# Patient Record
Sex: MALE | Race: WHITE | Employment: STUDENT | URBAN - METROPOLITAN AREA
[De-identification: names, ages, dates, MRNs, and addresses within clinical notes are randomized per-mention and may not be internally consistent; named-entity substitution may affect disease eponyms.]

---
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
## 2024-06-04 ENCOUNTER — OFFICE VISIT (OUTPATIENT)
Dept: FAMILY MEDICINE CLINIC | Facility: CLINIC | Age: 19
End: 2024-06-04
Payer: COMMERCIAL

## 2024-06-04 VITALS
RESPIRATION RATE: 16 BRPM | SYSTOLIC BLOOD PRESSURE: 116 MMHG | HEIGHT: 74 IN | DIASTOLIC BLOOD PRESSURE: 70 MMHG | OXYGEN SATURATION: 98 % | TEMPERATURE: 99 F | HEART RATE: 115 BPM | WEIGHT: 212 LBS | BODY MASS INDEX: 27.21 KG/M2

## 2024-06-04 DIAGNOSIS — H61.22 HEARING LOSS OF LEFT EAR DUE TO CERUMEN IMPACTION: Primary | ICD-10-CM

## 2024-06-04 PROCEDURE — 3074F SYST BP LT 130 MM HG: CPT | Performed by: FAMILY MEDICINE

## 2024-06-04 PROCEDURE — 3078F DIAST BP <80 MM HG: CPT | Performed by: FAMILY MEDICINE

## 2024-06-04 PROCEDURE — 99213 OFFICE O/P EST LOW 20 MIN: CPT | Performed by: FAMILY MEDICINE

## 2024-06-04 PROCEDURE — 3008F BODY MASS INDEX DOCD: CPT | Performed by: FAMILY MEDICINE

## 2024-06-04 RX ORDER — LISDEXAMFETAMINE DIMESYLATE CAPSULES 20 MG/1
20 CAPSULE ORAL EVERY MORNING
COMMUNITY
Start: 2024-04-03

## 2024-06-04 RX ORDER — LISDEXAMFETAMINE DIMESYLATE 30 MG/1
30 CAPSULE ORAL EVERY MORNING
COMMUNITY
Start: 2024-05-15

## 2024-06-04 RX ORDER — BREXPIPRAZOLE 0.5 MG/1
1 TABLET ORAL NIGHTLY
COMMUNITY
Start: 2024-02-08

## 2024-06-04 RX ORDER — BREXPIPRAZOLE 0.25 MG/1
TABLET ORAL
COMMUNITY
Start: 2023-12-27

## 2024-06-04 RX ORDER — LISDEXAMFETAMINE DIMESYLATE CAPSULES 10 MG/1
CAPSULE ORAL
COMMUNITY
Start: 2024-05-29

## 2024-06-04 RX ORDER — BUSPIRONE HYDROCHLORIDE 10 MG/1
TABLET ORAL
COMMUNITY
Start: 2020-08-01

## 2024-06-04 RX ORDER — PROPRANOLOL HYDROCHLORIDE 20 MG/1
TABLET ORAL
COMMUNITY
Start: 2022-01-01

## 2024-06-07 NOTE — PROGRESS NOTES
HPI:    Sin Mccauley is a 18 year old male who presents for Follow - Up (Continues to have issues with left ear. Not able to hear out of it per pt. )     Presenting with ear pressure and hearing distortion x few weeks/months, he reports no URI s/s.   Worse along left ear, history of excessive cerumen.     Past History:   He  has a past medical history of Allergic rhinitis, Anxiety, Asthma (HCC), Depression, and Extrinsic asthma, unspecified.   He  has no past surgical history on file.   His family history includes Cancer in his maternal grandfather; Depression in his maternal grandmother; Heart Disorder in his maternal grandmother; Stroke in his maternal grandfather.   He  reports that he has never smoked. He has never used smokeless tobacco. He reports that he does not drink alcohol and does not use drugs.     He is not on any long-term medications.   He is allergic to nuts.     Current Outpatient Medications on File Prior to Visit   Medication Sig    ARIPiprazole 2.5 mg Oral Tab     REXULTI 0.25 MG Oral Tab     REXULTI 0.5 MG Oral Tab Take 1 tablet by mouth nightly.    busPIRone 10 MG Oral Tab     Lisdexamfetamine Dimesylate 10 MG Oral Cap TAKE 1 CAPSULE BY MOUTH EVERY MORNING after meals with 30mg for total dose of 40mg    lisdexamfetamine 20 MG Oral Cap Take 1 capsule (20 mg total) by mouth every morning.    lisdexamfetamine 30 MG Oral Cap Take 1 capsule (30 mg total) by mouth every morning.    propranolol 20 MG Oral Tab     montelukast 10 MG Oral Tab Take 1 tablet (10 mg total) by mouth nightly.    PRISTIQ 25 MG Oral Tablet 24 Hr Take 1 tablet (25 mg total) by mouth daily.    albuterol (PROAIR HFA) 108 (90 Base) MCG/ACT Inhalation Aero Soln Inhale 2 puffs into the lungs every 6 (six) hours as needed for Wheezing or Shortness of Breath.     No current facility-administered medications on file prior to visit.         REVIEW OF SYSTEMS:   Patient denies shortness of breath, denies chest pain and denies any  recent fevers or chills.    Patient reports no urinary complaints and denies headaches or visual disturbances.   Patient denies any abdominal pain at this time. Patient has no new skin lesions.  Patient reports no acute back pain and reports no dizziness or headaches.   Patient reports no visual disturbances and reports hearing has been about the same.   Patient reports no recent injury or trauma.               EXAM:    /70   Pulse 115   Temp 98.9 °F (37.2 °C)   Resp 16   Ht 6' 2\" (1.88 m)   Wt 212 lb (96.2 kg)   SpO2 98%   BMI 27.22 kg/m²  Estimated body mass index is 27.22 kg/m² as calculated from the following:    Height as of this encounter: 6' 2\" (1.88 m).    Weight as of this encounter: 212 lb (96.2 kg).    General Appearance:  Alert, cooperative, no distress, appears stated age   Head:  Normocephalic, without obvious abnormality, atraumatic   Eyes:  conjunctiva/cornea is not erythematous.        Nose: No nasal drainage.    Throat: No erythema    Neck: Supple, symmetrical, trachea midline, and normal ROM  thyroid: no obvious nodules   Back:   Symmetric, no curvature, ROM normal, no CVA tenderness   Lungs:   Clear to auscultation bilaterally, respirations unlabored   Chest Wall:  No tenderness or deformity   Heart:  Regular rate and rhythm, S1, S2 normal, no murmur,   Abdomen:   Soft, non-tender, bowel sounds active. No hernia.    Genitalia:     Rectal:     Extremities: Extremities normal, atraumatic, no cyanosis or edema   Pulses: 2+ and symmetric   Skin: Skin color, texture, turgor normal, no new rashes    Lymph nodes: No obvious cervical adenopathy.    Neurologic and psych: Normal speech, Alert and oriented x 3.   Normal mood, normal insight and judgment.    -cerumen impaction, tried irrigation with some improvement.                 ASSESSMENT AND PLAN:   Diagnoses and all orders for this visit:    Hearing loss of left ear due to cerumen impaction  -     ENT Referral - In Network       -ENT  referral for further treatment.     Migel Hilliard MD, 6/7/2024, 8:23 AM     Note to patient: The 21st Century Cures Act makes medical notes like these available to patients in the interest of transparency. However, this is a medical document intended as peer to peer communication. It is written in medical language and may contain abbreviations or verbiage that are unfamiliar. It may appear blunt or direct. Medical documents are intended to carry relevant information, facts as evident, and the clinical opinion of the practitioner who signs the document.

## 2024-06-17 DIAGNOSIS — J30.1 SEASONAL ALLERGIC RHINITIS DUE TO POLLEN: ICD-10-CM

## 2024-06-18 RX ORDER — MONTELUKAST SODIUM 10 MG/1
10 TABLET ORAL NIGHTLY
Qty: 90 TABLET | Refills: 0 | Status: SHIPPED | OUTPATIENT
Start: 2024-06-18

## 2024-09-19 DIAGNOSIS — J30.1 SEASONAL ALLERGIC RHINITIS DUE TO POLLEN: ICD-10-CM

## 2024-09-19 RX ORDER — MONTELUKAST SODIUM 10 MG/1
10 TABLET ORAL NIGHTLY
Qty: 30 TABLET | Refills: 0 | Status: SHIPPED | OUTPATIENT
Start: 2024-09-19

## 2024-10-17 DIAGNOSIS — J30.1 SEASONAL ALLERGIC RHINITIS DUE TO POLLEN: ICD-10-CM

## 2024-10-18 RX ORDER — MONTELUKAST SODIUM 10 MG/1
10 TABLET ORAL NIGHTLY
Qty: 30 TABLET | Refills: 0 | Status: SHIPPED | OUTPATIENT
Start: 2024-10-18

## 2024-11-07 ENCOUNTER — OFFICE VISIT (OUTPATIENT)
Facility: LOCATION | Age: 19
End: 2024-11-07
Payer: COMMERCIAL

## 2024-11-07 DIAGNOSIS — H90.12 CONDUCTIVE HEARING LOSS OF LEFT EAR WITH UNRESTRICTED HEARING OF RIGHT EAR: Primary | ICD-10-CM

## 2024-11-07 DIAGNOSIS — H61.23 CERUMEN DEBRIS ON TYMPANIC MEMBRANE OF BOTH EARS: ICD-10-CM

## 2024-11-07 DIAGNOSIS — H93.293 ABNORMAL AUDITORY PERCEPTION OF BOTH EARS: Primary | ICD-10-CM

## 2024-11-07 PROCEDURE — 92557 COMPREHENSIVE HEARING TEST: CPT | Performed by: AUDIOLOGIST

## 2024-11-07 PROCEDURE — 92567 TYMPANOMETRY: CPT | Performed by: AUDIOLOGIST

## 2024-11-07 PROCEDURE — 99203 OFFICE O/P NEW LOW 30 MIN: CPT | Performed by: OTOLARYNGOLOGY

## 2024-11-07 RX ORDER — FLUTICASONE PROPIONATE 50 MCG
2 SPRAY, SUSPENSION (ML) NASAL DAILY
Qty: 16 G | Refills: 3 | Status: SHIPPED | OUTPATIENT
Start: 2024-11-07

## 2024-11-07 NOTE — PROGRESS NOTES
Sin Mccauley was seen for an audiometric evaluation and tympanogram today. Referred back to physician.    June [place], AuD

## 2024-11-08 NOTE — PROGRESS NOTES
Sin Mccaluey is a 19 year old male. No chief complaint on file.    HPI:   He has a history of decreased hearing.  Is mostly in the left ear.  He has small ear canals and wax impactions.  He denies pain.  Current Outpatient Medications   Medication Sig Dispense Refill    fluticasone propionate 50 MCG/ACT Nasal Suspension 2 sprays by Nasal route daily. 16 g 3    MONTELUKAST 10 MG Oral Tab TAKE 1 TABLET BY MOUTH EVERY NIGHT 30 tablet 0    ARIPiprazole 2.5 mg Oral Tab       REXULTI 0.25 MG Oral Tab       REXULTI 0.5 MG Oral Tab Take 1 tablet by mouth nightly.      busPIRone 10 MG Oral Tab       Lisdexamfetamine Dimesylate 10 MG Oral Cap TAKE 1 CAPSULE BY MOUTH EVERY MORNING after meals with 30mg for total dose of 40mg      lisdexamfetamine 20 MG Oral Cap Take 1 capsule (20 mg total) by mouth every morning.      lisdexamfetamine 30 MG Oral Cap Take 1 capsule (30 mg total) by mouth every morning.      propranolol 20 MG Oral Tab       PRISTIQ 25 MG Oral Tablet 24 Hr Take 1 tablet (25 mg total) by mouth daily.      albuterol (PROAIR HFA) 108 (90 Base) MCG/ACT Inhalation Aero Soln Inhale 2 puffs into the lungs every 6 (six) hours as needed for Wheezing or Shortness of Breath. 1 each 1      Past Medical History:    Allergic rhinitis    Anxiety    Asthma (HCC)    Depression    Extrinsic asthma, unspecified      Social History:  Social History     Socioeconomic History    Marital status: Single   Tobacco Use    Smoking status: Never    Smokeless tobacco: Never   Substance and Sexual Activity    Alcohol use: No    Drug use: No   Other Topics Concern    Caffeine Concern No    Exercise No    Seat Belt No    Special Diet No    Stress Concern Yes    Weight Concern No      History reviewed. No pertinent surgical history.      REVIEW OF SYSTEMS:   GENERAL HEALTH: feels well otherwise  GENERAL : denies fever, chills, sweats, weight loss, weight gain  SKIN: denies any unusual skin lesions or rashes  RESPIRATORY: denies shortness of  breath with exertion  NEURO: denies headaches    EXAM:   There were no vitals taken for this visit.    System Findings Details   Constitutional  Overall appearance - Normal.   Psychiatric  Orientation - Oriented to time, place, person & situation. Appropriate mood and affect.   Head/Face  Facial features -- Normal. Skull - Normal.   Eyes  Pupils equal ,round ,react to light and accomidate   Ears, Nose, Throat, Neck  Small ear canals with wax impactions bilaterally removed today under the microscope.  Left ear drum is dull.   Neurological  Memory - Normal. Cranial nerves - Cranial nerves II through XII grossly intact.   Lymph Detail  Submental. Submandibular. Anterior cervical. Posterior cervical. Supraclavicular.     Latest Audiogram Result (Hz) Exam performed: 11/7/2024 4:00 PM Last edited by June Dale Au.D on 11/7/2024 4:45 PM        125 250  1500 2000 3000 4000 6000 8000    Right air:  15 10  10  5  5  5    Left air:  20 15  20  35  45  35    Right mastoid bone:       10  0      Right mastoid bone (masked):   0  5          Left mastoid bone (masked):   -5  -5  15  5         Reliability:  Good    Transducer:  Inserts    Technique:  Conventional Audiometry    Comments:            Latest Speech Audiometry  Last edited by June Dale Au.D on 11/7/2024 4:42 PM       Ear Method PTA SAT SRT [facility] Test/list Score (%) Intensity Mask/noise Notes    right live voice   0   10 By Difficulty 100 40      left live voice   15   10 By Difficulty 96 60                    Latest Tympanogram Result       Probe Tone (Hz): Unknown Exam performed: 11/7/2024 4:00 PM Last edited by June Dale Au.D on 11/7/2024 4:45 PM      Tympanograms  These were drawn by a user, not generated from device data      Right Ear Left Ear                     Right Ear Left Ear    Tympanogram type: Type A Type B    Canal volume (mL): 1.25 0.43    Peak pressure (daPa): -16     Peak amplitude (mL): 0.64     Tympanogram  width (daPa):        Comments:                    Latest Audiogram and Tympanogram Result Text  Last edited by June Dale Au.D on 11/7/2024  4:45 PM      Study Result                 Narrative & Impression  Patient complained of left hearing loss. He denied tinnitus & dizziness.    Audiogram: Right wnl 250-8000 Hz. Left mild to moderate conductive hearing loss 250-8000 Hz.    Word Recognition Score in Quiet: Excellent for each ear, respectively.    Tympanometry: Right wnl. Left normal ear canal volume & absent compliance.    Recommend: Follow up with ENT.    Lee Rodriguez.                     ASSESSMENT AND PLAN:   1. Abnormal auditory perception of both ears  Audiogram reviewed which shows a conductive hearing loss in the left ear.  Tympanogram is type B would suggest fluid on the ear.  He denies any ear pain.  He will use Flonase and Sudafed regularly along with Claritin.  If his symptoms persist he will see me back with a dedicated follow-up audiogram.    2. Cerumen debris on tympanic membrane of both ears  Removed today.  He has small ear canals.  He may need to see me on a semiregular basis for cleaning.      The patient indicates understanding of these issues and agrees to the plan.    No follow-ups on file.    Fabian Frias MD  11/7/2024  6:18 PM

## 2024-11-14 DIAGNOSIS — J30.1 SEASONAL ALLERGIC RHINITIS DUE TO POLLEN: ICD-10-CM

## 2024-11-14 RX ORDER — MONTELUKAST SODIUM 10 MG/1
10 TABLET ORAL EVERY EVENING
Qty: 30 TABLET | Refills: 0 | Status: SHIPPED | OUTPATIENT
Start: 2024-11-14

## 2024-12-12 DIAGNOSIS — J30.1 SEASONAL ALLERGIC RHINITIS DUE TO POLLEN: ICD-10-CM

## 2024-12-12 RX ORDER — MONTELUKAST SODIUM 10 MG/1
10 TABLET ORAL EVERY EVENING
Qty: 30 TABLET | Refills: 0 | Status: SHIPPED | OUTPATIENT
Start: 2024-12-12

## 2024-12-27 ENCOUNTER — OFFICE VISIT (OUTPATIENT)
Dept: FAMILY MEDICINE CLINIC | Facility: CLINIC | Age: 19
End: 2024-12-27
Payer: COMMERCIAL

## 2024-12-27 VITALS
WEIGHT: 202 LBS | OXYGEN SATURATION: 98 % | BODY MASS INDEX: 26.77 KG/M2 | SYSTOLIC BLOOD PRESSURE: 118 MMHG | HEIGHT: 73 IN | DIASTOLIC BLOOD PRESSURE: 66 MMHG

## 2024-12-27 DIAGNOSIS — J45.909 MODERATE ASTHMA WITHOUT COMPLICATION, UNSPECIFIED WHETHER PERSISTENT (HCC): ICD-10-CM

## 2024-12-27 DIAGNOSIS — H65.92 FLUID LEVEL BEHIND TYMPANIC MEMBRANE OF LEFT EAR: ICD-10-CM

## 2024-12-27 DIAGNOSIS — F33.1 MODERATE EPISODE OF RECURRENT MAJOR DEPRESSIVE DISORDER (HCC): ICD-10-CM

## 2024-12-27 DIAGNOSIS — J30.1 SEASONAL ALLERGIC RHINITIS DUE TO POLLEN: ICD-10-CM

## 2024-12-27 DIAGNOSIS — Z00.00 ANNUAL PHYSICAL EXAM: Primary | ICD-10-CM

## 2024-12-27 DIAGNOSIS — Z23 NEED FOR VACCINATION: ICD-10-CM

## 2024-12-27 DIAGNOSIS — F84.0 AUTISM SPECTRUM (HCC): ICD-10-CM

## 2024-12-27 DIAGNOSIS — F40.10 SOCIAL PHOBIA: ICD-10-CM

## 2024-12-27 RX ORDER — LEVOCETIRIZINE DIHYDROCHLORIDE 5 MG/1
5 TABLET, FILM COATED ORAL EVERY EVENING
Qty: 90 TABLET | Refills: 2 | Status: SHIPPED | OUTPATIENT
Start: 2024-12-27

## 2024-12-27 RX ORDER — MONTELUKAST SODIUM 10 MG/1
10 TABLET ORAL EVERY EVENING
Qty: 90 TABLET | Refills: 2 | Status: SHIPPED | OUTPATIENT
Start: 2024-12-27

## 2024-12-28 NOTE — PROGRESS NOTES
HPI:    Sin Mccauley is a 19 year old male who presents for Physical (Reviewed Preventative/Wellness form with patient.//Physical and flu shot /Also is having ear pain in left ear . Has been on going for over a year now.)     Presenting for wellness visit.   History of autism with social anxiety with stable mood and recently got job, improving mood.   History of asthma, currently under control.   Seeing psychiatry for autism, with major depression, and ADRIAN-mood is currently stable on rx.     Past History:   He  has a past medical history of Allergic rhinitis, Anxiety, Asthma (HCC), Depression, and Extrinsic asthma, unspecified.   He  has no past surgical history on file.   His family history includes Cancer in his maternal grandfather; Depression in his maternal grandmother; Heart Disorder in his maternal grandmother; Stroke in his maternal grandfather.   He  reports that he has never smoked. He has never used smokeless tobacco. He reports that he does not drink alcohol and does not use drugs.     He is not on any long-term medications.   He is allergic to nuts.     Current Outpatient Medications on File Prior to Visit   Medication Sig    fluticasone propionate 50 MCG/ACT Nasal Suspension 2 sprays by Nasal route daily.    ARIPiprazole 2.5 mg Oral Tab     REXULTI 0.25 MG Oral Tab     REXULTI 0.5 MG Oral Tab Take 1 tablet by mouth nightly.    busPIRone 10 MG Oral Tab     Lisdexamfetamine Dimesylate 10 MG Oral Cap TAKE 1 CAPSULE BY MOUTH EVERY MORNING after meals with 30mg for total dose of 40mg    lisdexamfetamine 20 MG Oral Cap Take 1 capsule (20 mg total) by mouth every morning.    lisdexamfetamine 30 MG Oral Cap Take 1 capsule (30 mg total) by mouth every morning.    propranolol 20 MG Oral Tab     PRISTIQ 25 MG Oral Tablet 24 Hr Take 1 tablet (25 mg total) by mouth daily.    albuterol (PROAIR HFA) 108 (90 Base) MCG/ACT Inhalation Aero Soln Inhale 2 puffs into the lungs every 6 (six) hours as needed for Wheezing  or Shortness of Breath.     No current facility-administered medications on file prior to visit.         REVIEW OF SYSTEMS:   Patient denies shortness of breath, denies chest pain and denies any recent fevers or chills.    Patient reports no urinary complaints and denies headaches or visual disturbances.   Patient denies any abdominal pain at this time. Patient has no new skin lesions.  Patient reports no acute back pain and reports no dizziness or headaches.   Patient reports no visual disturbances and reports hearing has been about the same.   Patient reports no recent injury or trauma.               EXAM:    /66   Ht 6' 1\" (1.854 m)   Wt 202 lb (91.6 kg)   SpO2 98%   BMI 26.65 kg/m²  Estimated body mass index is 26.65 kg/m² as calculated from the following:    Height as of this encounter: 6' 1\" (1.854 m).    Weight as of this encounter: 202 lb (91.6 kg).    General Appearance:  Alert, cooperative, no distress, appears stated age   Head:  Normocephalic, without obvious abnormality, atraumatic   Eyes:  PERRL, conjunctiva/corneas clear, EOM's intact, fundi benign, both eyes   Ears:  Normal TM's and external ear canals, both ears   Nose: Nares normal, septum midline, mucosa normal, no drainage or sinus tenderness   Throat: Lips, mucosa, and tongue normal; teeth and gums normal   Neck: Supple, symmetrical, trachea midline, no adenopathy, thyroid: not enlarged, symmetric, no tenderness/mass/nodules, no carotid bruit or JVD   Back:   Symmetric, no curvature, ROM normal, no CVA tenderness   Lungs:   Clear to auscultation bilaterally, respirations unlabored   Chest Wall:  No tenderness or deformity   Heart:  Regular rate and rhythm, S1, S2 normal, no murmur, rub or gallop   Abdomen:   Soft, non-tender, bowel sounds active all four quadrants,  no masses, no organomegaly   Genitalia:     Rectal:     Extremities: Extremities normal, atraumatic, no cyanosis or edema   Pulses: 2+ and symmetric   Skin: Skin color,  texture, turgor normal, no rashes or lesions   Lymph nodes: Cervical, supraclavicular, and axillary nodes normal   Neurologic: Normal     Stable mood on rx: reviewed medication and side effects.               ASSESSMENT AND PLAN:   Diagnoses and all orders for this visit:  1. Annual physical exam  -wellness visit was done    2. Need for vaccination    - INFLUENZA VACCINE, TRI, PRESERV FREE, 0.5 ML    3. Moderate asthma without complication, unspecified whether persistent (HCC)  -updated vaccine as below  - PCV20 (Prevnar 20)    4. Autism spectrum (HCC)  -stable, seeing psych. CPM    5. Fluid level behind tympanic membrane of left ear  -allergy rx as below:  - montelukast 10 MG Oral Tab; Take 1 tablet (10 mg total) by mouth every evening.  Dispense: 90 tablet; Refill: 2  - levocetirizine 5 MG Oral Tab; Take 1 tablet (5 mg total) by mouth every evening.  Dispense: 90 tablet; Refill: 2    6. Seasonal allergic rhinitis due to pollen    - montelukast 10 MG Oral Tab; Take 1 tablet (10 mg total) by mouth every evening.  Dispense: 90 tablet; Refill: 2  - levocetirizine 5 MG Oral Tab; Take 1 tablet (5 mg total) by mouth every evening.  Dispense: 90 tablet; Refill: 2    7. Moderate episode of recurrent major depressive disorder (HCC)  -on rx per psych, CPM    8. Social phobia  -stable, seeing psych.     Follow up in 6-12 months.

## 2025-05-27 ENCOUNTER — OFFICE VISIT (OUTPATIENT)
Facility: LOCATION | Age: 20
End: 2025-05-27
Payer: COMMERCIAL

## 2025-05-27 DIAGNOSIS — H93.293 ABNORMAL AUDITORY PERCEPTION OF BOTH EARS: Primary | ICD-10-CM

## 2025-05-27 DIAGNOSIS — H90.12 CONDUCTIVE HEARING LOSS OF LEFT EAR WITH UNRESTRICTED HEARING OF RIGHT EAR: Primary | ICD-10-CM

## 2025-05-27 DIAGNOSIS — H61.23 BILATERAL IMPACTED CERUMEN: ICD-10-CM

## 2025-05-27 PROCEDURE — 92557 COMPREHENSIVE HEARING TEST: CPT | Performed by: AUDIOLOGIST

## 2025-05-27 PROCEDURE — 69210 REMOVE IMPACTED EAR WAX UNI: CPT | Performed by: OTOLARYNGOLOGY

## 2025-05-27 PROCEDURE — 92567 TYMPANOMETRY: CPT | Performed by: AUDIOLOGIST

## 2025-05-27 PROCEDURE — 99214 OFFICE O/P EST MOD 30 MIN: CPT | Performed by: OTOLARYNGOLOGY

## 2025-05-27 NOTE — PROGRESS NOTES
Sin Mccauley was seen for an audiometric evaluation and tympanogram today. Referred back to physician. Repeat hearing test post medical management.     Loli Richter M.A. TRACY-A

## 2025-05-27 NOTE — PROGRESS NOTES
Otolaryngology Consultation Note     HPI: 20 y/o M previously seen for hearing loss, cerumen by Dr. Frias. Here for follow up. Cont to have hearing loss on the left. Scheduled to undergo audiogram but was told he had cerumen in both ears. No tinnitus, otalgia, otorrhea or vertigo. No other new complaints since last visit.      Past Medical History: Past Medical History[1]     Past Surgical History: Past Surgical History[2]     Medication: Scheduled Meds:Scheduled Medications[3]  Continuous Infusions:Medication Infusions[4]  PRN Meds:.PRN Medications[5]     Allergies:  Allergies[6]  Pertinent Family History: Family History[7]     Pertinent Social History:   Social History     Socioeconomic History    Marital status: Single     Spouse name: Not on file    Number of children: Not on file    Years of education: Not on file    Highest education level: Not on file   Occupational History    Not on file   Tobacco Use    Smoking status: Never    Smokeless tobacco: Never   Substance and Sexual Activity    Alcohol use: No    Drug use: No    Sexual activity: Not on file   Other Topics Concern    Caffeine Concern No    Exercise No    Seat Belt No    Special Diet No    Stress Concern Yes    Weight Concern No   Social History Narrative    Not on file     Social Drivers of Health     Food Insecurity: Not on file   Transportation Needs: Not on file   Stress: Not on file   Housing Stability: Not on file        Review of Systems:  Constitutional: Negative.  HENT: see above  Eyes: Negative.  Respiratory: Negative.  Cardiovascular: Negative.  Gastrointestinal: Negative.  Musculoskeletal: Negative.  Skin: Negative.  Renal: Negative  Endocrine: Negative  Psychiatric/Behavioral: Negative.     Physical Examination:  Vitals: There were no vitals taken for this visit.     General: Breathing comfortably on room air while sitting up. Able to communicate verbally. Voice normal. Normal appearing body habitus.     Musculoskeletal: Head:  Atraumatic and normocephalic.     Neck: Full ROM and able to extend without issues     Ears: External auditory canals with cerumen impaction, unable to see tympanic membranes.      Eyes: Extraocular movements intact and pupils equally reactive to light stimulus. No spontaneous or gaze-evoked nystagmus. No proptosis or ecchymosis. VFI.     Neuro: CN 7 intact with symmetric mobility and strength. No loss of facial sensation.     Skin: Dry, normal turgor, normal color.     Psych: Alert and oriented to person/place/time. Normal affect, amiable    Procedure:  Patient name: Sin Mccauley     YOB: 2005     Procedure: Bilateral cerumen cleaning using binocular microscopy     Indication: Cerumen impaction impairing bilateral exam of clinically significant portions of the external auditory canal, tympanic membrane, and middle ear space.     Surgeon: Jennifer Mora MD     Anesthesia: None     Detail: Informed consent obtained by patient. Patient laid supine in the examination chair. Right ear brought into focus using the microscope and an ear speculum. Cerumen was visualized in the external auditory canal and cleaned using cerumen curettes and a speculum. Tympanic membrane was visualized and was noted to be intact with no evidence of perforation, retraction, or middle ear effusion. Contralateral ear was addressed in a similar manner with similar findings. Patient tolerated the procedure well without complication.     Disposition: Patient instructed to follow up as described in assessment and plan portion of this notation.     Audiogram personally reviewed  My interpretation is as follows    Normal hearing with mild -1000 Hz on the right  Mild CHL hearing on the left  Normal type A tympanogram on the right  Shallow type As tympanogram on the left  % (R), 100% (L)    Assessment/Plan:     Left hearing loss: mild CHL on audiogram today. ABG possibly secondary to small amount of residual cerumen  overlying tympanic membrane. No e/o middle ear effusion noted. Recommend observation with rpt audiogram in 3 months. Patient and patient's Mom agree with this plan. Will monitor.   Bilateral cerumen impaction: removed today. Recommend Debrox gtt prn.        Jennifer Mora MD         [1]   Past Medical History:   Allergic rhinitis    Anxiety    Asthma (HCC)    Depression    Extrinsic asthma, unspecified   [2] No past surgical history on file.  [3] [4] [5] [6]   Allergies  Allergen Reactions    Nuts ANAPHYLAXIS   [7]   Family History  Problem Relation Age of Onset    Cancer Maternal Grandfather     Stroke Maternal Grandfather     Depression Maternal Grandmother     Heart Disorder Maternal Grandmother

## (undated) DIAGNOSIS — J45.20 MILD INTERMITTENT ASTHMA WITHOUT COMPLICATION: ICD-10-CM

## (undated) NOTE — LETTER
[address] [place] [employer] Child Health Examination       Student's Name  Rebeca Kaia Birth Title                           Date     Signature 12th Grade   HEALTH HISTORY          TO BE COMPLETED AND SIGNED BY PARENT/GUARDIAN AND VERIFIED BY HEALTH CARE PROVIDER    ALLERGIES  (Food, drug, insect, other)  Nuts MEDICATION  (List all prescribed or taken on a regular basis.)    Current Outpatient Med frequency)? Yes   No    Heart murmur/High blood pressure? Yes   No  Alcohol/Drug use? Yes   No    Dizziness or chest pain with exercise? Yes   No  Fam hx sudden death < age 48 (Cause?)    Yes   No    Eye/Vision problems?   Yes  No   Glasses  Yes   N Test:     Date Read                  /      /              Result:                     mm    ______________                         Blood Test:   Date Reported          /      /              Result:                  Value ______________               LAB T day, I approve this child's participation in        (If No or Modified, please attach explanation.)  PHYSICAL EDUCATION    {DMG_Y/N/MODIFIED:1369::\"Yes\"}      INTERSCHOLASTIC SPORTS   {KACEY, Y/N/MODIFIED:4953::\"Yes\"}   Physician/Advanced Practice Nurs

## (undated) NOTE — LETTER
ASTHMA ACTION PLAN for Kandice Pardo     : 8/3/2005     Date: 2020  Provider:  Adrian Hair MD  Phone for doctor or clinic: [facility], [address], [facility] [address], [facility] [address]            You can use t

## (undated) NOTE — LETTER
ASTHMA ACTION PLAN for Mike Gutierrez     : 8/3/2005     Date: 1/15/2018  Provider:  Bina Brito MD  Phone for doctor or clinic: [address] [facility] [address], [address] [facility]  [phone]           You can use t Asthma Action Plan reviewed with patient (and caregiver if necessary) on the phone and mailed copy to patient or submitted via [employer].      Signatures:  Provider  Johanne Hadley MD   Patient Caretaker

## (undated) NOTE — MR AVS SNAPSHOT
[address] [facility] [address] [phone]               Thank you for choosing us for your health care visit with Brady Pettit MD.  We are glad to serve you and happy to provide you with this summary of your Clindamycin Phos-Benzoyl Perox 1-5 % Gel   Apply 1 Application topically nightly. Commonly known as:  BENZACLIN           loratadine 10 MG Tabs   Take 10 mg by mouth daily.    Commonly known as:  CLARITIN           Montelukast Sodium 5 MG Chew   CHEW 1 T help them live a healthy active lifestyle.     To lead a healthy active life, families can strive to reach these goals:  o 5 servings of fruits and vegetables a day  o 4 servings of water a day  o 3 servings of low-fat dairy a day  o 2 or less hours of scre

## (undated) NOTE — LETTER
[address] [facility] Child Health Examination       Student's Name  Abby Bond Signature                                                                                                                                   Title                           Date     Signature Male School   Grade Level/ID#  11th Grade   HEALTH HISTORY          TO BE COMPLETED AND SIGNED BY PARENT/GUARDIAN AND VERIFIED BY HEALTH CARE PROVIDER    ALLERGIES  (Food, drug, insect, other)  Nuts MEDICATION  (List all prescribed or taken on a regular ba age 48 (Cause?)    Yes   No    Eye/Vision problems?   Yes  No   Glasses  Yes   No  Contacts  Yes    No   Last eye exam___  Other concerns? (crossed eye, drooping lids, squinting, difficulty reading) Dental:  ____Braces    ____Bridge    ____Plate    ____Othe Blood Test:   Date Reported          /      /              Result:                  Value ______________               LAB TESTS (Recommended) Date Results  Date Results   Hemoglobin or Hematocrit   Sickle Cell  (when indicated)     Urinalysis   Dev Physician/Advanced Practice Nurse/Physician Assistant performing examination  Print Name  Brady Pettit MD                                                 Signature                                                                                Date  6/27/20

## (undated) NOTE — ED AVS SNAPSHOT
[facility] in [address] [place] [address],[address]    [address]    Phone:  [phone]    Fax:  [phone]           David    MRN: [id number]    Department:  THE [facility] in [address] [place] [address],[address]   Date of Visit:  5/10/2017 Jose, [address]  [phone] [address] [place] [address], [facility] [address]   [phone]       To Check ER Wait Times:  TEXT 'Yulissa Raya' to [id number]      Click [email or web address]      Or call [phone]    If you have any problems with y phone number before you leave. After you leave, you should follow the attached instructions. I have read and understand the instructions given to me by my caregivers.         24-Hour Pharmacies        Pharmacy Address Phone Number   [facility] [phone] [employer] access allows you to view health information for your child from their recent   visit, view other health information and more. To sign up or find more information on getting   Proxy Access to your child’s [employer] go to [email or web address]

## (undated) NOTE — LETTER
ASTHMA ACTION PLAN for Salvador Gomez     : 8/3/2005     Date: 10/6/2022  Provider:  Terri Strauss MD  Phone for doctor or clinic: [facility], [address], [place]  [address], [facility] [address] [address] [phone]    ACT Score: 21      You can use the colors of a traffic light to help learn about your asthma medicines. 1. Green - Go! % of Personal Best Peak Flow Use controller medicine. Breathing is good  No cough or wheeze  Can work and play Medicine How much to take When to take it          2. Yellow - Caution. 50-79% Personal Best Peak  Flow. Use reliever medicine to keep an asthma attack from getting bad. Cough  Wheezing  Tight Chest  Wake up at night Medicine How much to take When to take it    Albuterol Inhaler                   2 puffs                             Every 4 to 6 hrs as needed for                                                                                  shortness of breath and wheezing. Additional instructions Call our office in event of an increased use of rescue inhaler to make an appointment in order to prevent red zone symptoms. 3. Red - Stop! Danger!  <50% Personal Best Peak  Flow. Take these medications until  Get help from a doctor   Medicine not helping  Breathing is hard and fast  Nose opens wide  Can't walk  Ribs show  Can't talk well Medicine How much to take When to take it    Call 911, or go to the emergency room immediately! Take Albuterol every 15 minutes until you have received medical attention. Additional Instructions If your symptoms do not improve and you cannot contact your doctor, [facility] or call 911 immediately! [x] Asthma Action Plan reviewed with patient (and caregiver if necessary) and a copy of the plan was given to the patient/caregiver. [] Asthma Action Plan reviewed with patient (and caregiver if necessary) on the phone and mailed copy to patient or submitted via [address] Signatures:  Provider  Terri Strauss MD   Patient Caretaker

## (undated) NOTE — LETTER
[address] [facility] Child Health Examination       Student's Name  Asad Phillips  Title                           Date     Signature 12th Grade   HEALTH HISTORY          TO BE COMPLETED AND SIGNED BY PARENT/GUARDIAN AND VERIFIED BY HEALTH CARE PROVIDER    ALLERGIES  (Food, drug, insect, other)  Nuts MEDICATION  (List all prescribed or taken on a regular basis.)    Current Outpatient Med frequency)? Yes   No    Heart murmur/High blood pressure? Yes   No  Alcohol/Drug use? Yes   No    Dizziness or chest pain with exercise? Yes   No  Fam hx sudden death < age 48 (Cause?)    Yes   No    Eye/Vision problems?   Yes  No   Glasses  Yes   N Test:     Date Read                  /      /              Result:                     mm    ______________                         Blood Test:   Date Reported          /      /              Result:                  Value ______________               LAB T explanation.)  PHYSICAL EDUCATION    Yes      INTERSCHOLASTIC SPORTS   Yes   Physician/Advanced Practice Nurse/Physician Assistant performing examination  Print Name  Johanne Hadley MD                                                 Signature